# Patient Record
Sex: FEMALE | Race: WHITE | NOT HISPANIC OR LATINO | Employment: PART TIME | URBAN - METROPOLITAN AREA
[De-identification: names, ages, dates, MRNs, and addresses within clinical notes are randomized per-mention and may not be internally consistent; named-entity substitution may affect disease eponyms.]

---

## 2023-07-18 LAB
EXTERNAL CHLAMYDIA SCREEN: NORMAL
EXTERNAL GONORRHEA SCREEN: NORMAL
EXTERNAL HEPATITIS B SURFACE ANTIGEN: NEGATIVE
EXTERNAL HEPATITIS C AB: NEGATIVE
EXTERNAL RUBELLA QUALITATIVE: NORMAL
EXTERNAL SYPHILIS RPR SCREEN: NORMAL
HIV1 P24 AG SERPL QL IA: NEGATIVE

## 2023-12-06 LAB — EXTERNAL GLUCOSE TOLERANCE TEST FASTING: 140 MG/DL

## 2023-12-13 LAB — EXTERNAL GTT 3 HOURS: 85

## 2024-01-25 ENCOUNTER — HOSPITAL ENCOUNTER (INPATIENT)
Facility: HOSPITAL | Age: 24
LOS: 3 days | Discharge: HOME | End: 2024-01-29
Attending: OBSTETRICS & GYNECOLOGY | Admitting: OBSTETRICS & GYNECOLOGY
Payer: COMMERCIAL

## 2024-01-25 PROCEDURE — 25810000003 LACTATED RINGERS PER 1000 ML: Performed by: OBSTETRICS & GYNECOLOGY

## 2024-01-25 PROCEDURE — 25010000002 MAGNESIUM SULFATE 20 GM/500ML SOLUTION: Performed by: OBSTETRICS & GYNECOLOGY

## 2024-01-25 RX ORDER — MONTELUKAST SODIUM 10 MG/1
10 TABLET ORAL NIGHTLY
COMMUNITY

## 2024-01-25 RX ORDER — CETIRIZINE HYDROCHLORIDE 10 MG/1
10 TABLET ORAL DAILY
COMMUNITY

## 2024-01-25 RX ORDER — MAGNESIUM SULFATE HEPTAHYDRATE 40 MG/ML
2 INJECTION, SOLUTION INTRAVENOUS CONTINUOUS
Status: DISCONTINUED | OUTPATIENT
Start: 2024-01-26 | End: 2024-01-28

## 2024-01-25 RX ORDER — FAMOTIDINE 20 MG/1
20 TABLET, FILM COATED ORAL 2 TIMES DAILY
COMMUNITY

## 2024-01-25 RX ORDER — PRENATAL WITH FERROUS FUM AND FOLIC ACID 3080; 920; 120; 400; 22; 1.84; 3; 20; 10; 1; 12; 200; 27; 25; 2 [IU]/1; [IU]/1; MG/1; [IU]/1; MG/1; MG/1; MG/1; MG/1; MG/1; MG/1; UG/1; MG/1; MG/1; MG/1; MG/1
1 TABLET ORAL DAILY
COMMUNITY

## 2024-01-25 RX ORDER — SODIUM CHLORIDE, SODIUM LACTATE, POTASSIUM CHLORIDE, CALCIUM CHLORIDE 600; 310; 30; 20 MG/100ML; MG/100ML; MG/100ML; MG/100ML
75 INJECTION, SOLUTION INTRAVENOUS CONTINUOUS
Status: DISCONTINUED | OUTPATIENT
Start: 2024-01-26 | End: 2024-01-27

## 2024-01-25 RX ORDER — ASCORBIC ACID 500 MG
500 TABLET ORAL DAILY
COMMUNITY

## 2024-01-25 RX ORDER — MAGNESIUM SULFATE HEPTAHYDRATE 40 MG/ML
INJECTION, SOLUTION INTRAVENOUS
Status: ACTIVE
Start: 2024-01-25 | End: 2024-01-26

## 2024-01-25 RX ORDER — LORATADINE 10 MG/1
10 TABLET ORAL DAILY
COMMUNITY

## 2024-01-25 RX ADMIN — SODIUM CHLORIDE, POTASSIUM CHLORIDE, SODIUM LACTATE AND CALCIUM CHLORIDE 75 ML/HR: 600; 310; 30; 20 INJECTION, SOLUTION INTRAVENOUS at 23:55

## 2024-01-25 RX ADMIN — MAGNESIUM SULFATE HEPTAHYDRATE 2 G/HR: 40 INJECTION, SOLUTION INTRAVENOUS at 23:55

## 2024-01-26 ENCOUNTER — ANESTHESIA (OUTPATIENT)
Dept: LABOR AND DELIVERY | Facility: HOSPITAL | Age: 24
End: 2024-01-26
Payer: COMMERCIAL

## 2024-01-26 ENCOUNTER — ANESTHESIA EVENT (OUTPATIENT)
Dept: LABOR AND DELIVERY | Facility: HOSPITAL | Age: 24
End: 2024-01-26
Payer: COMMERCIAL

## 2024-01-26 PROBLEM — O14.13 PREECLAMPSIA, SEVERE, THIRD TRIMESTER: Status: ACTIVE | Noted: 2024-01-26

## 2024-01-26 LAB
ABO GROUP BLD: NORMAL
ABO GROUP BLD: NORMAL
ALBUMIN SERPL-MCNC: 3.4 G/DL (ref 3.5–5.2)
ALBUMIN/GLOB SERPL: 1.2 G/DL
ALP SERPL-CCNC: 131 U/L (ref 39–117)
ALT SERPL W P-5'-P-CCNC: 7 U/L (ref 1–33)
AMPHET+METHAMPHET UR QL: NEGATIVE
AMPHETAMINES UR QL: NEGATIVE
ANION GAP SERPL CALCULATED.3IONS-SCNC: 14 MMOL/L (ref 5–15)
AST SERPL-CCNC: 15 U/L (ref 1–32)
BARBITURATES UR QL SCN: POSITIVE
BENZODIAZ UR QL SCN: NEGATIVE
BILIRUB SERPL-MCNC: 0.7 MG/DL (ref 0–1.2)
BLD GP AB SCN SERPL QL: POSITIVE
BUN SERPL-MCNC: 3 MG/DL (ref 6–20)
BUN/CREAT SERPL: 5.3 (ref 7–25)
BUPRENORPHINE SERPL-MCNC: NEGATIVE NG/ML
CALCIUM SPEC-SCNC: 8.1 MG/DL (ref 8.6–10.5)
CANNABINOIDS SERPL QL: NEGATIVE
CHLORIDE SERPL-SCNC: 102 MMOL/L (ref 98–107)
CO2 SERPL-SCNC: 20 MMOL/L (ref 22–29)
COCAINE UR QL: NEGATIVE
CREAT SERPL-MCNC: 0.57 MG/DL (ref 0.57–1)
DEPRECATED RDW RBC AUTO: 43.6 FL (ref 37–54)
EGFRCR SERPLBLD CKD-EPI 2021: 132 ML/MIN/1.73
ERYTHROCYTE [DISTWIDTH] IN BLOOD BY AUTOMATED COUNT: 12.5 % (ref 12.3–15.4)
FENTANYL UR-MCNC: NEGATIVE NG/ML
GLOBULIN UR ELPH-MCNC: 2.9 GM/DL
GLUCOSE SERPL-MCNC: 126 MG/DL (ref 65–99)
HCT VFR BLD AUTO: 34.2 % (ref 34–46.6)
HGB BLD-MCNC: 11.7 G/DL (ref 12–15.9)
LDH SERPL-CCNC: 181 U/L (ref 135–214)
MCH RBC QN AUTO: 32.5 PG (ref 26.6–33)
MCHC RBC AUTO-ENTMCNC: 34.2 G/DL (ref 31.5–35.7)
MCV RBC AUTO: 95 FL (ref 79–97)
METHADONE UR QL SCN: NEGATIVE
OPIATES UR QL: NEGATIVE
OXYCODONE UR QL SCN: NEGATIVE
PCP UR QL SCN: NEGATIVE
PLATELET # BLD AUTO: 281 10*3/MM3 (ref 140–450)
PMV BLD AUTO: 9.7 FL (ref 6–12)
POTASSIUM SERPL-SCNC: 3.4 MMOL/L (ref 3.5–5.2)
PROT SERPL-MCNC: 6.3 G/DL (ref 6–8.5)
RBC # BLD AUTO: 3.6 10*6/MM3 (ref 3.77–5.28)
RESIDUAL RHIG DETECTED: NORMAL
RH BLD: NEGATIVE
RH BLD: NEGATIVE
SODIUM SERPL-SCNC: 136 MMOL/L (ref 136–145)
T PALLIDUM IGG SER QL: NORMAL
T&S EXPIRATION DATE: NORMAL
TRICYCLICS UR QL SCN: NEGATIVE
URATE SERPL-MCNC: 5.1 MG/DL (ref 2.4–5.7)
WBC NRBC COR # BLD AUTO: 16.32 10*3/MM3 (ref 3.4–10.8)

## 2024-01-26 PROCEDURE — 99222 1ST HOSP IP/OBS MODERATE 55: CPT | Performed by: OBSTETRICS & GYNECOLOGY

## 2024-01-26 PROCEDURE — 80053 COMPREHEN METABOLIC PANEL: CPT | Performed by: OBSTETRICS & GYNECOLOGY

## 2024-01-26 PROCEDURE — 86780 TREPONEMA PALLIDUM: CPT | Performed by: OBSTETRICS & GYNECOLOGY

## 2024-01-26 PROCEDURE — G0480 DRUG TEST DEF 1-7 CLASSES: HCPCS | Performed by: OBSTETRICS & GYNECOLOGY

## 2024-01-26 PROCEDURE — 84550 ASSAY OF BLOOD/URIC ACID: CPT | Performed by: OBSTETRICS & GYNECOLOGY

## 2024-01-26 PROCEDURE — 25010000002 PENICILLIN G POTASSIUM PER 600000 UNITS: Performed by: OBSTETRICS & GYNECOLOGY

## 2024-01-26 PROCEDURE — 59025 FETAL NON-STRESS TEST: CPT

## 2024-01-26 PROCEDURE — 86901 BLOOD TYPING SEROLOGIC RH(D): CPT

## 2024-01-26 PROCEDURE — 59200 INSERT CERVICAL DILATOR: CPT | Performed by: OBSTETRICS & GYNECOLOGY

## 2024-01-26 PROCEDURE — 86901 BLOOD TYPING SEROLOGIC RH(D): CPT | Performed by: OBSTETRICS & GYNECOLOGY

## 2024-01-26 PROCEDURE — 86870 RBC ANTIBODY IDENTIFICATION: CPT | Performed by: OBSTETRICS & GYNECOLOGY

## 2024-01-26 PROCEDURE — 80307 DRUG TEST PRSMV CHEM ANLYZR: CPT | Performed by: OBSTETRICS & GYNECOLOGY

## 2024-01-26 PROCEDURE — 83615 LACTATE (LD) (LDH) ENZYME: CPT | Performed by: OBSTETRICS & GYNECOLOGY

## 2024-01-26 PROCEDURE — 25010000002 MAGNESIUM SULFATE 20 GM/500ML SOLUTION: Performed by: OBSTETRICS & GYNECOLOGY

## 2024-01-26 PROCEDURE — 86850 RBC ANTIBODY SCREEN: CPT | Performed by: OBSTETRICS & GYNECOLOGY

## 2024-01-26 PROCEDURE — 25810000003 LACTATED RINGERS PER 1000 ML: Performed by: OBSTETRICS & GYNECOLOGY

## 2024-01-26 PROCEDURE — 86900 BLOOD TYPING SEROLOGIC ABO: CPT

## 2024-01-26 PROCEDURE — 86900 BLOOD TYPING SEROLOGIC ABO: CPT | Performed by: OBSTETRICS & GYNECOLOGY

## 2024-01-26 PROCEDURE — 36415 COLL VENOUS BLD VENIPUNCTURE: CPT | Performed by: OBSTETRICS & GYNECOLOGY

## 2024-01-26 PROCEDURE — 10907ZC DRAINAGE OF AMNIOTIC FLUID, THERAPEUTIC FROM PRODUCTS OF CONCEPTION, VIA NATURAL OR ARTIFICIAL OPENING: ICD-10-PCS | Performed by: OBSTETRICS & GYNECOLOGY

## 2024-01-26 PROCEDURE — 85027 COMPLETE CBC AUTOMATED: CPT | Performed by: OBSTETRICS & GYNECOLOGY

## 2024-01-26 PROCEDURE — 3E033VJ INTRODUCTION OF OTHER HORMONE INTO PERIPHERAL VEIN, PERCUTANEOUS APPROACH: ICD-10-PCS | Performed by: OBSTETRICS & GYNECOLOGY

## 2024-01-26 RX ORDER — MONTELUKAST SODIUM 10 MG/1
10 TABLET ORAL NIGHTLY
Status: DISCONTINUED | OUTPATIENT
Start: 2024-01-26 | End: 2024-01-29 | Stop reason: HOSPADM

## 2024-01-26 RX ORDER — PROMETHAZINE HYDROCHLORIDE 12.5 MG/1
12.5 SUPPOSITORY RECTAL EVERY 6 HOURS PRN
Status: DISCONTINUED | OUTPATIENT
Start: 2024-01-26 | End: 2024-01-29 | Stop reason: HOSPADM

## 2024-01-26 RX ORDER — SODIUM CHLORIDE 0.9 % (FLUSH) 0.9 %
10 SYRINGE (ML) INJECTION EVERY 12 HOURS SCHEDULED
Status: DISCONTINUED | OUTPATIENT
Start: 2024-01-26 | End: 2024-01-29 | Stop reason: HOSPADM

## 2024-01-26 RX ORDER — SODIUM CHLORIDE 0.9 % (FLUSH) 0.9 %
10 SYRINGE (ML) INJECTION AS NEEDED
Status: DISCONTINUED | OUTPATIENT
Start: 2024-01-26 | End: 2024-01-29 | Stop reason: HOSPADM

## 2024-01-26 RX ORDER — PROMETHAZINE HYDROCHLORIDE 12.5 MG/1
12.5 TABLET ORAL EVERY 6 HOURS PRN
Status: DISCONTINUED | OUTPATIENT
Start: 2024-01-26 | End: 2024-01-29 | Stop reason: HOSPADM

## 2024-01-26 RX ORDER — MORPHINE SULFATE 2 MG/ML
2 INJECTION, SOLUTION INTRAMUSCULAR; INTRAVENOUS
Status: DISCONTINUED | OUTPATIENT
Start: 2024-01-26 | End: 2024-01-29 | Stop reason: HOSPADM

## 2024-01-26 RX ORDER — SODIUM CHLORIDE 9 MG/ML
40 INJECTION, SOLUTION INTRAVENOUS AS NEEDED
Status: DISCONTINUED | OUTPATIENT
Start: 2024-01-26 | End: 2024-01-29 | Stop reason: HOSPADM

## 2024-01-26 RX ORDER — DIPHENHYDRAMINE HCL 25 MG
25 CAPSULE ORAL EVERY 6 HOURS PRN
Status: DISCONTINUED | OUTPATIENT
Start: 2024-01-26 | End: 2024-01-29 | Stop reason: HOSPADM

## 2024-01-26 RX ORDER — ONDANSETRON 4 MG/1
4 TABLET, ORALLY DISINTEGRATING ORAL EVERY 6 HOURS PRN
Status: DISCONTINUED | OUTPATIENT
Start: 2024-01-26 | End: 2024-01-29 | Stop reason: HOSPADM

## 2024-01-26 RX ORDER — POTASSIUM CHLORIDE 750 MG/1
20 CAPSULE, EXTENDED RELEASE ORAL ONCE
Status: COMPLETED | OUTPATIENT
Start: 2024-01-26 | End: 2024-01-26

## 2024-01-26 RX ORDER — MAGNESIUM CARB/ALUMINUM HYDROX 105-160MG
30 TABLET,CHEWABLE ORAL ONCE
Status: DISCONTINUED | OUTPATIENT
Start: 2024-01-26 | End: 2024-01-27

## 2024-01-26 RX ORDER — OXYTOCIN/0.9 % SODIUM CHLORIDE 30/500 ML
999 PLASTIC BAG, INJECTION (ML) INTRAVENOUS ONCE
Status: COMPLETED | OUTPATIENT
Start: 2024-01-26 | End: 2024-01-27

## 2024-01-26 RX ORDER — CETIRIZINE HYDROCHLORIDE 10 MG/1
10 TABLET ORAL DAILY
Status: DISCONTINUED | OUTPATIENT
Start: 2024-01-26 | End: 2024-01-29 | Stop reason: HOSPADM

## 2024-01-26 RX ORDER — LIDOCAINE HYDROCHLORIDE 10 MG/ML
0.5 INJECTION, SOLUTION EPIDURAL; INFILTRATION; INTRACAUDAL; PERINEURAL ONCE AS NEEDED
Status: DISCONTINUED | OUTPATIENT
Start: 2024-01-26 | End: 2024-01-29 | Stop reason: HOSPADM

## 2024-01-26 RX ORDER — IBUPROFEN 600 MG/1
600 TABLET ORAL EVERY 6 HOURS PRN
Status: DISCONTINUED | OUTPATIENT
Start: 2024-01-26 | End: 2024-01-27

## 2024-01-26 RX ORDER — SODIUM CHLORIDE, SODIUM LACTATE, POTASSIUM CHLORIDE, CALCIUM CHLORIDE 600; 310; 30; 20 MG/100ML; MG/100ML; MG/100ML; MG/100ML
125 INJECTION, SOLUTION INTRAVENOUS CONTINUOUS
Status: DISCONTINUED | OUTPATIENT
Start: 2024-01-26 | End: 2024-01-29 | Stop reason: HOSPADM

## 2024-01-26 RX ORDER — ACETAMINOPHEN 325 MG/1
650 TABLET ORAL EVERY 4 HOURS PRN
Status: DISCONTINUED | OUTPATIENT
Start: 2024-01-26 | End: 2024-01-27

## 2024-01-26 RX ORDER — OXYTOCIN/0.9 % SODIUM CHLORIDE 30/500 ML
2-20 PLASTIC BAG, INJECTION (ML) INTRAVENOUS
Status: DISCONTINUED | OUTPATIENT
Start: 2024-01-26 | End: 2024-01-27

## 2024-01-26 RX ORDER — FLUOXETINE HYDROCHLORIDE 20 MG/1
20 CAPSULE ORAL DAILY
COMMUNITY

## 2024-01-26 RX ORDER — BUTALBITAL, ACETAMINOPHEN AND CAFFEINE 50; 325; 40 MG/1; MG/1; MG/1
2 TABLET ORAL EVERY 4 HOURS PRN
Status: DISCONTINUED | OUTPATIENT
Start: 2024-01-26 | End: 2024-01-29 | Stop reason: HOSPADM

## 2024-01-26 RX ORDER — OXYTOCIN/0.9 % SODIUM CHLORIDE 30/500 ML
250 PLASTIC BAG, INJECTION (ML) INTRAVENOUS CONTINUOUS
Status: ACTIVE | OUTPATIENT
Start: 2024-01-26 | End: 2024-01-27

## 2024-01-26 RX ORDER — PENICILLIN G 3000000 [IU]/50ML
3 INJECTION, SOLUTION INTRAVENOUS EVERY 4 HOURS
Qty: 600 ML | Refills: 0 | Status: DISCONTINUED | OUTPATIENT
Start: 2024-01-26 | End: 2024-01-27

## 2024-01-26 RX ORDER — FAMOTIDINE 20 MG/1
20 TABLET, FILM COATED ORAL
Status: DISCONTINUED | OUTPATIENT
Start: 2024-01-26 | End: 2024-01-29 | Stop reason: HOSPADM

## 2024-01-26 RX ORDER — ONDANSETRON 2 MG/ML
4 INJECTION INTRAMUSCULAR; INTRAVENOUS EVERY 6 HOURS PRN
Status: DISCONTINUED | OUTPATIENT
Start: 2024-01-26 | End: 2024-01-29 | Stop reason: HOSPADM

## 2024-01-26 RX ADMIN — FAMOTIDINE 20 MG: 20 TABLET, FILM COATED ORAL at 17:31

## 2024-01-26 RX ADMIN — BUTALBITAL, ACETAMINOPHEN, AND CAFFEINE 2 TABLET: 50; 325; 40 TABLET ORAL at 00:54

## 2024-01-26 RX ADMIN — PENICILLIN G 3 MILLION UNITS: 3000000 INJECTION, SOLUTION INTRAVENOUS at 17:31

## 2024-01-26 RX ADMIN — PENICILLIN G 3 MILLION UNITS: 3000000 INJECTION, SOLUTION INTRAVENOUS at 21:26

## 2024-01-26 RX ADMIN — CETIRIZINE HYDROCHLORIDE 10 MG: 10 TABLET, FILM COATED ORAL at 10:34

## 2024-01-26 RX ADMIN — Medication 2 MILLI-UNITS/MIN: at 09:03

## 2024-01-26 RX ADMIN — MONTELUKAST 10 MG: 10 TABLET, FILM COATED ORAL at 22:41

## 2024-01-26 RX ADMIN — PENICILLIN G 3 MILLION UNITS: 3000000 INJECTION, SOLUTION INTRAVENOUS at 14:35

## 2024-01-26 RX ADMIN — MAGNESIUM SULFATE HEPTAHYDRATE 2 G/HR: 40 INJECTION, SOLUTION INTRAVENOUS at 09:34

## 2024-01-26 RX ADMIN — SODIUM CHLORIDE 5 MILLION UNITS: 900 INJECTION INTRAVENOUS at 07:55

## 2024-01-26 RX ADMIN — METOPROLOL TARTRATE 25 MG: 25 TABLET, FILM COATED ORAL at 10:34

## 2024-01-26 RX ADMIN — POTASSIUM CHLORIDE 20 MEQ: 750 CAPSULE, EXTENDED RELEASE ORAL at 14:35

## 2024-01-26 RX ADMIN — SODIUM CHLORIDE, POTASSIUM CHLORIDE, SODIUM LACTATE AND CALCIUM CHLORIDE 75 ML/HR: 600; 310; 30; 20 INJECTION, SOLUTION INTRAVENOUS at 13:20

## 2024-01-26 RX ADMIN — BUTALBITAL, ACETAMINOPHEN, AND CAFFEINE 2 TABLET: 50; 325; 40 TABLET ORAL at 08:36

## 2024-01-26 NOTE — PROGRESS NOTES
Laborist    Patient seen and examined chart reviewed  Cervical Maurer in place patient without complaints.  Fetal heart rate category 1  Blood pressure stable  Cephalic confirmed with portable ultrasound  PLAN  Continue induction of labor.

## 2024-01-26 NOTE — PROCEDURES
24 y.o.  OB History          1    Para        Term                AB        Living             SAB        IAB        Ectopic        Molar        Multiple        Live Births                 Presents 35 0/7 as an induction of labour due to severe pre-eclampsia   Her primary OB requests a Maurer Bulb placement to initiate the induction of labour.    Fetal Heart Rate Assessment   Method: Fetal HR Assessment Method: external   Beats/min: Fetal HR (beats/min): 125   Baseline: Fetal HR Baseline: normal range   Varibility: Fetal HR Variability: moderate (amplitude range 6 to 25 bpm)   Accels: Fetal HR Accelerations: greater than/equal to 15 bpm, lasting at least 15 seconds   Decels: Fetal HR Decelerations: absent   Tracing Category:       TOCO:  Irregular   SVE:  1/60/-2    A Maurer Bulb was placed without difficulties with 60 cc of sterile saline.  The patient tolerated the procedure well.

## 2024-01-26 NOTE — H&P
"[facility]  Obstetric History and Physical    Transfer of care:  [facility]     Chief Complaint   Patient presents with    Hypertension       HPI:    Patient is a 24 y.o. female  currently at 35w0d, who presents by EMS as a transfer of care due to pre-eclampsia.   Toya reports having a headache that really has not gone away for about 2 weeks.  She has also had elevated blood pressures over the last couple of weeks.  Her labs have been normal.  She had a 24 hour urine protein on .   She received a 6 gm load of magnesium followed by 2gms/hr maintenance.   She still reports a headache that was a 8/10, but decreased down to a 4-5/10 after Fioricet.   She denies vision changes and RUQ pian.    She is having some contractions that she does feel as mild tightening.  She denies vaginal bleeding and leaking.  +FM.  She was reported to be 1 cm prior to being transferred   She has a history of tachycardia and has been taking Metoprolol 25 mg BID      The following portions of the patients history were reviewed and updated as appropriate: current medications, allergies, past medical history, past surgical history, past family history, past social history and problem list .       Prenatal Information:   Maternal Prenatal Labs  Blood Type ABO Type   Date Value Ref Range Status   2024 O  Final      Rh Status RH type   Date Value Ref Range Status   2024 Negative  Final      Antibody Screen Antibody Screen   Date Value Ref Range Status   2024 Positive  Final      Gonnorhea No results found for: \"GCCX\"   Chlamydia No results found for: \"CLAMYDCU\"   RPR No results found for: \"RPR\"   Syphilis Antibody No results found for: \"SYPHILIS\"   Rubella No results found for: \"RUBELLAIGGIN\"   Hepatitis B Surface Antigen No results found for: \"HEPBSAG\"   HIV-1 Antibody No results found for: \"LABHIV1\"   Hepatitis C Antibody No results found for: \"HEPCAB\"   Rapid Urin Drug Screen No results found " "for: \"AMPMETHU\", \"BARBITSCNUR\", \"LABBENZSCN\", \"LABMETHSCN\", \"LABOPIASCN\", \"THCURSCR\", \"COCAINEUR\", \"AMPHETSCREEN\", \"PROPOXSCN\", \"BUPRENORSCNU\", \"METAMPSCNUR\", \"OXYCODONESCN\", \"TRICYCLICSCN\"   Group B Strep Culture No results found for: \"GBSANTIGEN\"           External Prenatal Results       Pregnancy Outside Results - Transcribed From Office Records - See Scanned Records For Details       Test Value Date Time    ABO  O  24 012    Rh  Negative  24    Antibody Screen  Positive  24 012    Varicella IgG       Rubella       Hgb  11.7 g/dL 24 0003    Hct  34.2 % 24 0003    Glucose Fasting GTT       Glucose Tolerance Test 1 hour       Glucose Tolerance Test 3 hour       Gonorrhea (discrete)       Chlamydia (discrete)       RPR       VDRL       Syphilis Antibody       HBsAg       Herpes Simplex Virus PCR       Herpes Simplex VIrus Culture       HIV       Hep C RNA Quant PCR       Hep C Antibody       AFP       Group B Strep       GBS Susceptibility to Clindamycin       GBS Susceptibility to Erythromycin       Fetal Fibronectin       Genetic Testing, Maternal Blood                 Drug Screening       Test Value Date Time    Urine Drug Screen       Amphetamine Screen       Barbiturate Screen       Benzodiazepine Screen       Methadone Screen       Phencyclidine Screen       Opiates Screen       THC Screen       Cocaine Screen       Propoxyphene Screen       Buprenorphine Screen       Methamphetamine Screen       Oxycodone Screen       Tricyclic Antidepressants Screen                 Legend    ^: Historical                              Past OB History:     OB History    Para Term  AB Living   1 0 0 0 0 0   SAB IAB Ectopic Molar Multiple Live Births   0 0 0 0 0 0      # Outcome Date GA Lbr Slim/2nd Weight Sex Delivery Anes PTL Lv   1 Current                Past Medical History: Past Medical History:   Diagnosis Date    Rh incompatibility       Past Surgical History Past Surgical " History:   Procedure Laterality Date    LAPAROSCOPIC CHOLECYSTECTOMY  04/01/2021      Family History: No family history on file.   Social History:  reports that she has never smoked. She does not have any smokeless tobacco history on file.   reports that she does not currently use alcohol.   reports that she does not currently use drugs.        Review of Systems  Denies fever,CP, Shortness of air, muscle weakness,                                             and rashes      Objective     Vital Signs Range for the last 24 hours  Temperature: Temp:  [98.2 °F (36.8 °C)] 98.2 °F (36.8 °C)   Temp Source: Temp src: Oral   BP: BP: (131-141)/(65-87) 139/72   Pulse: Heart Rate:  [] 101   Respirations: Resp:  [16-18] 16   SPO2: SpO2:  [99 %-100 %] 99 %   O2 Amount (l/min):     O2 Devices Device (Oxygen Therapy): room air   Weight: Weight:  [105 kg (231 lb)] 105 kg (231 lb)     Physical Examination: General appearance - alert with no acute distress   Chest - CTAB  Heart - Tachycardia   Abdomen - soft, nontender, nondistended,   no rebound tenderness noted  No guarding, No RUQ pain, gravid   Extremities - pedal edema +1      Fetal Heart Rate Assessment   Method: Fetal HR Assessment Method: external   Beats/min: Fetal HR (beats/min): 135   Baseline: Fetal HR Baseline: normal range   Varibility: Fetal HR Variability: moderate (amplitude range 6 to 25 bpm)   Accels: Fetal HR Accelerations: greater than/equal to 15 bpm, lasting at least 15 seconds   Decels: Fetal HR Decelerations: absent   Tracing Category:       Uterine Assessment   Method: Method: external tocotransducer   Frequency (min): Contraction Frequency (Minutes): 4-5   Ctx Count in 10 min:     Duration:     Intensity: Contraction Intensity: mild by palpation   Intensity by IUPC:     Resting Tone: Uterine Resting Tone: soft by palpation   Resting Tone by IUPC:       Labs:  Lab Results   Component Value Date     01/26/2024    HGB 11.7 (L) 01/26/2024    HCT 34.2  01/26/2024    WBC 16.32 (H) 01/26/2024      Lab Results   Component Value Date    HGB 11.7 (L) 01/26/2024     01/26/2024    AST 15 01/26/2024    ALT 7 01/26/2024     01/26/2024    URICACID 5.1 01/26/2024    BUN 3 (L) 01/26/2024    CREATININE 0.57 01/26/2024    GLUCOSE 126 (H) 01/26/2024        Assessment:  1. Intrauterine pregnancy at 35w0d weeks gestation with reactive fetal status  2. Severe pre-eclampsia with mild range blood pressures and neurological symptoms  3. Tachycardia    4.  Rhesus negative   5. GBS unknown     Plan:  1. Admit  2. IV, Magnesium 2 gms/hour  3. ABX for GBS unknown   4.  Maurer bulb placement for IOL  5.  Pitocin  6. NICU consult       Alessandro Romero MD  1/26/2024  04:42 EST

## 2024-01-27 LAB
ATMOSPHERIC PRESS: ABNORMAL MM[HG]
ATMOSPHERIC PRESS: ABNORMAL MM[HG]
BASE EXCESS BLDCOA CALC-SCNC: -6.3 MMOL/L (ref 0–2)
BASE EXCESS BLDCOV CALC-SCNC: -1.2 MMOL/L (ref 0–2)
BDY SITE: ABNORMAL
BDY SITE: ABNORMAL
BODY TEMPERATURE: 37
BODY TEMPERATURE: 37
CO2 BLDA-SCNC: 23.8 MMOL/L (ref 22–33)
CO2 BLDA-SCNC: 24.6 MMOL/L (ref 22–33)
EPAP: 0
EPAP: 0
HCO3 BLDCOA-SCNC: 22.9 MMOL/L (ref 16.9–20.5)
HCO3 BLDCOV-SCNC: 22.7 MMOL/L (ref 18.6–21.4)
HGB BLDA-MCNC: 21.2 G/DL (ref 14–18)
HGB BLDA-MCNC: 21.8 G/DL (ref 14–18)
INHALED O2 CONCENTRATION: 21 %
INHALED O2 CONCENTRATION: 21 %
IPAP: 0
IPAP: 0
MODALITY: ABNORMAL
MODALITY: ABNORMAL
PAW @ PEAK INSP FLOW SETTING VENT: 0 CMH2O
PAW @ PEAK INSP FLOW SETTING VENT: 0 CMH2O
PCO2 BLDCOA: 56.4 MMHG (ref 43.3–54.9)
PCO2 BLDCOV: 35.9 MM HG (ref 28–40)
PH BLDCOA: 7.22 PH UNITS (ref 7.22–7.3)
PH BLDCOV: 7.41 PH UNITS (ref 7.31–7.37)
PO2 BLDCOA: 27.7 MMHG (ref 11.5–43.3)
PO2 BLDCOV: 27.2 MM HG (ref 21–31)
SAO2 % BLDCOA: 53 %
SAO2 % BLDCOA: ABNORMAL %
SAO2 % BLDCOV: 69.9 %
TOTAL RATE: 0 BREATHS/MINUTE
TOTAL RATE: 0 BREATHS/MINUTE
VENTILATOR MODE: ABNORMAL
VENTILATOR MODE: ABNORMAL

## 2024-01-27 PROCEDURE — 59025 FETAL NON-STRESS TEST: CPT

## 2024-01-27 PROCEDURE — 25010000002 FENTANYL CITRATE (PF) 50 MCG/ML SOLUTION: Performed by: OBSTETRICS & GYNECOLOGY

## 2024-01-27 PROCEDURE — 0KQM0ZZ REPAIR PERINEUM MUSCLE, OPEN APPROACH: ICD-10-PCS | Performed by: OBSTETRICS & GYNECOLOGY

## 2024-01-27 PROCEDURE — 25810000003 LACTATED RINGERS PER 1000 ML: Performed by: OBSTETRICS & GYNECOLOGY

## 2024-01-27 PROCEDURE — 94799 UNLISTED PULMONARY SVC/PX: CPT

## 2024-01-27 PROCEDURE — 25010000002 ROPIVACAINE PER 1 MG: Performed by: ANESTHESIOLOGY

## 2024-01-27 PROCEDURE — 59409 OBSTETRICAL CARE: CPT | Performed by: OBSTETRICS & GYNECOLOGY

## 2024-01-27 PROCEDURE — 25010000002 BUPIVACAINE (PF) 0.25 % SOLUTION: Performed by: ANESTHESIOLOGY

## 2024-01-27 PROCEDURE — 25010000002 MAGNESIUM SULFATE 20 GM/500ML SOLUTION: Performed by: OBSTETRICS & GYNECOLOGY

## 2024-01-27 PROCEDURE — 25010000002 FENTANYL CITRATE (PF) 50 MCG/ML SOLUTION: Performed by: ANESTHESIOLOGY

## 2024-01-27 PROCEDURE — C1755 CATHETER, INTRASPINAL: HCPCS

## 2024-01-27 PROCEDURE — 25010000002 PENICILLIN G POTASSIUM PER 600000 UNITS: Performed by: OBSTETRICS & GYNECOLOGY

## 2024-01-27 PROCEDURE — 51703 INSERT BLADDER CATH COMPLEX: CPT

## 2024-01-27 PROCEDURE — 82805 BLOOD GASES W/O2 SATURATION: CPT

## 2024-01-27 PROCEDURE — C1755 CATHETER, INTRASPINAL: HCPCS | Performed by: ANESTHESIOLOGY

## 2024-01-27 RX ORDER — LIDOCAINE HYDROCHLORIDE 10 MG/ML
INJECTION, SOLUTION INFILTRATION; PERINEURAL
Status: DISCONTINUED
Start: 2024-01-27 | End: 2024-01-29 | Stop reason: HOSPADM

## 2024-01-27 RX ORDER — BISACODYL 10 MG
10 SUPPOSITORY, RECTAL RECTAL DAILY PRN
Status: DISCONTINUED | OUTPATIENT
Start: 2024-01-28 | End: 2024-01-29 | Stop reason: HOSPADM

## 2024-01-27 RX ORDER — FENTANYL CITRATE 50 UG/ML
INJECTION, SOLUTION INTRAMUSCULAR; INTRAVENOUS AS NEEDED
Status: DISCONTINUED | OUTPATIENT
Start: 2024-01-27 | End: 2024-01-27 | Stop reason: SURG

## 2024-01-27 RX ORDER — ROPIVACAINE HYDROCHLORIDE 2 MG/ML
15 INJECTION, SOLUTION EPIDURAL; INFILTRATION; PERINEURAL CONTINUOUS
Status: DISCONTINUED | OUTPATIENT
Start: 2024-01-27 | End: 2024-01-27

## 2024-01-27 RX ORDER — FAMOTIDINE 10 MG/ML
20 INJECTION, SOLUTION INTRAVENOUS ONCE AS NEEDED
Status: DISCONTINUED | OUTPATIENT
Start: 2024-01-27 | End: 2024-01-29 | Stop reason: HOSPADM

## 2024-01-27 RX ORDER — ASCORBIC ACID 500 MG
500 TABLET ORAL
Status: DISCONTINUED | OUTPATIENT
Start: 2024-01-28 | End: 2024-01-29 | Stop reason: HOSPADM

## 2024-01-27 RX ORDER — ACETAMINOPHEN 325 MG/1
650 TABLET ORAL EVERY 6 HOURS PRN
Status: DISCONTINUED | OUTPATIENT
Start: 2024-01-27 | End: 2024-01-29 | Stop reason: HOSPADM

## 2024-01-27 RX ORDER — SODIUM CHLORIDE 0.9 % (FLUSH) 0.9 %
1-10 SYRINGE (ML) INJECTION AS NEEDED
Status: DISCONTINUED | OUTPATIENT
Start: 2024-01-27 | End: 2024-01-29 | Stop reason: HOSPADM

## 2024-01-27 RX ORDER — ONDANSETRON 2 MG/ML
4 INJECTION INTRAMUSCULAR; INTRAVENOUS ONCE AS NEEDED
Status: DISCONTINUED | OUTPATIENT
Start: 2024-01-27 | End: 2024-01-29 | Stop reason: HOSPADM

## 2024-01-27 RX ORDER — BUPIVACAINE HYDROCHLORIDE 2.5 MG/ML
INJECTION, SOLUTION EPIDURAL; INFILTRATION; INTRACAUDAL AS NEEDED
Status: DISCONTINUED | OUTPATIENT
Start: 2024-01-27 | End: 2024-01-27 | Stop reason: SURG

## 2024-01-27 RX ORDER — IBUPROFEN 600 MG/1
600 TABLET ORAL EVERY 6 HOURS PRN
Status: DISCONTINUED | OUTPATIENT
Start: 2024-01-27 | End: 2024-01-29 | Stop reason: HOSPADM

## 2024-01-27 RX ORDER — DIPHENHYDRAMINE HYDROCHLORIDE 50 MG/ML
12.5 INJECTION INTRAMUSCULAR; INTRAVENOUS EVERY 8 HOURS PRN
Status: DISCONTINUED | OUTPATIENT
Start: 2024-01-27 | End: 2024-01-29 | Stop reason: HOSPADM

## 2024-01-27 RX ORDER — OXYTOCIN/0.9 % SODIUM CHLORIDE 30/500 ML
125 PLASTIC BAG, INJECTION (ML) INTRAVENOUS CONTINUOUS PRN
Status: DISCONTINUED | OUTPATIENT
Start: 2024-01-27 | End: 2024-01-29 | Stop reason: HOSPADM

## 2024-01-27 RX ORDER — HYDROXYZINE HYDROCHLORIDE 25 MG/1
50 TABLET, FILM COATED ORAL NIGHTLY PRN
Status: DISCONTINUED | OUTPATIENT
Start: 2024-01-27 | End: 2024-01-29 | Stop reason: HOSPADM

## 2024-01-27 RX ORDER — HYDROCODONE BITARTRATE AND ACETAMINOPHEN 10; 325 MG/1; MG/1
1 TABLET ORAL EVERY 4 HOURS PRN
Status: DISCONTINUED | OUTPATIENT
Start: 2024-01-27 | End: 2024-01-29 | Stop reason: HOSPADM

## 2024-01-27 RX ORDER — HYDROCODONE BITARTRATE AND ACETAMINOPHEN 5; 325 MG/1; MG/1
1 TABLET ORAL EVERY 4 HOURS PRN
Status: DISCONTINUED | OUTPATIENT
Start: 2024-01-27 | End: 2024-01-29 | Stop reason: HOSPADM

## 2024-01-27 RX ORDER — CITRIC ACID/SODIUM CITRATE 334-500MG
30 SOLUTION, ORAL ORAL ONCE
Status: DISCONTINUED | OUTPATIENT
Start: 2024-01-27 | End: 2024-01-29 | Stop reason: HOSPADM

## 2024-01-27 RX ORDER — DOCUSATE SODIUM 100 MG/1
100 CAPSULE, LIQUID FILLED ORAL 2 TIMES DAILY
Status: DISCONTINUED | OUTPATIENT
Start: 2024-01-27 | End: 2024-01-29 | Stop reason: HOSPADM

## 2024-01-27 RX ORDER — FENTANYL CITRATE 50 UG/ML
50 INJECTION, SOLUTION INTRAMUSCULAR; INTRAVENOUS ONCE
Status: COMPLETED | OUTPATIENT
Start: 2024-01-27 | End: 2024-01-27

## 2024-01-27 RX ORDER — LIDOCAINE HYDROCHLORIDE AND EPINEPHRINE 15; 5 MG/ML; UG/ML
INJECTION, SOLUTION EPIDURAL AS NEEDED
Status: DISCONTINUED | OUTPATIENT
Start: 2024-01-27 | End: 2024-01-27 | Stop reason: SURG

## 2024-01-27 RX ORDER — HYDROCORTISONE 25 MG/G
1 CREAM TOPICAL AS NEEDED
Status: DISCONTINUED | OUTPATIENT
Start: 2024-01-27 | End: 2024-01-29 | Stop reason: HOSPADM

## 2024-01-27 RX ORDER — FERROUS SULFATE 325(65) MG
150 TABLET ORAL
Status: DISCONTINUED | OUTPATIENT
Start: 2024-01-28 | End: 2024-01-29 | Stop reason: HOSPADM

## 2024-01-27 RX ORDER — EPHEDRINE SULFATE 5 MG/ML
10 INJECTION INTRAVENOUS
Status: DISCONTINUED | OUTPATIENT
Start: 2024-01-27 | End: 2024-01-29 | Stop reason: HOSPADM

## 2024-01-27 RX ADMIN — ROPIVACAINE HYDROCHLORIDE 14 ML/HR: 2 INJECTION, SOLUTION EPIDURAL; INFILTRATION at 02:48

## 2024-01-27 RX ADMIN — Medication 999 ML/HR: at 07:12

## 2024-01-27 RX ADMIN — Medication 10 ML: at 21:04

## 2024-01-27 RX ADMIN — PENICILLIN G 3 MILLION UNITS: 3000000 INJECTION, SOLUTION INTRAVENOUS at 01:34

## 2024-01-27 RX ADMIN — IBUPROFEN 600 MG: 600 TABLET ORAL at 12:30

## 2024-01-27 RX ADMIN — LIDOCAINE HYDROCHLORIDE AND EPINEPHRINE 3 ML: 15; 5 INJECTION, SOLUTION EPIDURAL at 02:40

## 2024-01-27 RX ADMIN — OXYCODONE HYDROCHLORIDE AND ACETAMINOPHEN 500 MG: 500 TABLET ORAL at 23:39

## 2024-01-27 RX ADMIN — FERROUS SULFATE TAB 325 MG (65 MG ELEMENTAL FE) 162.5 MG: 325 (65 FE) TAB at 23:33

## 2024-01-27 RX ADMIN — PENICILLIN G 3 MILLION UNITS: 3000000 INJECTION, SOLUTION INTRAVENOUS at 05:54

## 2024-01-27 RX ADMIN — BUPIVACAINE HYDROCHLORIDE 10 ML: 2.5 INJECTION, SOLUTION EPIDURAL; INFILTRATION; INTRACAUDAL; PERINEURAL at 02:43

## 2024-01-27 RX ADMIN — MONTELUKAST 10 MG: 10 TABLET, FILM COATED ORAL at 21:08

## 2024-01-27 RX ADMIN — FENTANYL CITRATE 100 MCG: 50 INJECTION, SOLUTION INTRAMUSCULAR; INTRAVENOUS at 02:42

## 2024-01-27 RX ADMIN — LIDOCAINE HYDROCHLORIDE AND EPINEPHRINE 2 ML: 15; 5 INJECTION, SOLUTION EPIDURAL at 02:41

## 2024-01-27 RX ADMIN — SODIUM CHLORIDE, POTASSIUM CHLORIDE, SODIUM LACTATE AND CALCIUM CHLORIDE 100 ML/HR: 600; 310; 30; 20 INJECTION, SOLUTION INTRAVENOUS at 09:16

## 2024-01-27 RX ADMIN — IBUPROFEN 600 MG: 600 TABLET ORAL at 19:48

## 2024-01-27 RX ADMIN — FENTANYL CITRATE 50 MCG: 50 INJECTION, SOLUTION INTRAMUSCULAR; INTRAVENOUS at 02:09

## 2024-01-27 RX ADMIN — BENZOCAINE AND LEVOMENTHOL 1 APPLICATION: 200; 5 SPRAY TOPICAL at 12:30

## 2024-01-27 RX ADMIN — CETIRIZINE HYDROCHLORIDE 10 MG: 10 TABLET, FILM COATED ORAL at 11:21

## 2024-01-27 RX ADMIN — HYDROCODONE BITARTRATE AND ACETAMINOPHEN 1 TABLET: 5; 325 TABLET ORAL at 23:32

## 2024-01-27 RX ADMIN — FAMOTIDINE 20 MG: 20 TABLET, FILM COATED ORAL at 11:21

## 2024-01-27 RX ADMIN — MAGNESIUM SULFATE HEPTAHYDRATE 1 G/HR: 40 INJECTION, SOLUTION INTRAVENOUS at 05:57

## 2024-01-27 RX ADMIN — SODIUM CHLORIDE, POTASSIUM CHLORIDE, SODIUM LACTATE AND CALCIUM CHLORIDE 75 ML/HR: 600; 310; 30; 20 INJECTION, SOLUTION INTRAVENOUS at 05:58

## 2024-01-27 RX ADMIN — DOCUSATE SODIUM 100 MG: 100 CAPSULE, LIQUID FILLED ORAL at 11:21

## 2024-01-27 RX ADMIN — DOCUSATE SODIUM 100 MG: 100 CAPSULE, LIQUID FILLED ORAL at 21:08

## 2024-01-27 NOTE — L&D DELIVERY NOTE
[facility]   Vaginal Delivery Note    Patient Name: Toya Root  : 2000  MRN: [id number]    Date of Delivery: 2024     Diagnosis     Pre & Post-Delivery:  Intrauterine pregnancy at 35w1d  Labor status: Induced Onset of Labor     Preeclampsia, severe, third trimester             Problem List    Transfer to Postpartum     Review the Delivery Report for details.     Delivery     Delivery: Vaginal, Spontaneous     YOB: 2022    Time of Birth:  Gestational Age 7:09 AM   35w1d     Anesthesia: Epidural     Delivering clinician: Blanco SANCHEZ High    Forceps?   No   Vacuum? No    Shoulder dystocia present: No        Delivery narrative:  Pt spont del a male infant over an intact perineum.  Shoulder cord reduced on perineum.  Cord milked X 3,  cord clamped and cut.  Segment for ABG's obtained.  Cord blood obtained and placenta del intact.  Bilat sulcus tears repaired and second degree perineal repaired with restoration of normal anatomy.  Uterus explored with no palpable fragments.      Infant     Findings: male  infant     Infant observations: Weight: No birth weight on file.   Length:   in  Observations/Comments:        Apgars: 6  @ 1 minute /    7  @ 5 minutes   Infant Name: Carlos     Placenta & Cord         Placenta delivered  Spontaneous  at   2024  7:12 AM     Cord:   present.   Nuchal Cord?  no   Cord blood obtained:     Cord gases obtained:      Cord gas results: Venous:    pH, Cord Venous   Date Value Ref Range Status   2024 7.408 (H) 7.310 - 7.370 pH Units Final     Base Excess, Cord Venous   Date Value Ref Range Status   2024 -1.2 (L) 0.0 - 2.0 mmol/L Final       Arterial:    pH, Cord Arterial   Date Value Ref Range Status   2024 7.22 7.22 - 7.30 pH Units Final     Base Exc, Cord Arterial   Date Value Ref Range Status   2024 -6.3 (L) 0.0 - 2.0 mmol/L Final        Repair     Episiotomy: None     No    Lacerations: Yes  Laceration  Information  Laceration Repaired?   Perineal: None      Periurethral:       Labial:       Sulcus: bilateral  Yes    Vaginal:       Cervical:         Suture used for repair: 2-0 vicryl rapide     Estimated Blood Loss: Est. Blood Loss (mL): 100 mL (Filed from Delivery Summary) (01/27/24 0709)     Quantitative Blood Loss:          Complications     hypertension    Disposition     Mother to APU  in stable condition currently.  Baby to NICU  in stable condition currently.    Blanco Mathews MD  01/27/24  07:41 EST

## 2024-01-27 NOTE — PROGRESS NOTES
Toya Root  [id number]  2000      VE 7-8/90%/-1.  Pt now with epidural  FHT's reassuring, contractions adequate.  BP's stable  P/1)continue current management    Blanco Mathews MD  1/27/2024  06:08 EST

## 2024-01-27 NOTE — PROGRESS NOTES
Toya Root  [id number]  2000      VE 5/80%/-1, IUPC placed  FHT's reassuring, contractions still prob not adequate  BP's stable  P/1)continue current management\     2)epidural when anesthesia is available      Blanco Mathews MD  1/27/2024  01:58 EST

## 2024-01-27 NOTE — ANESTHESIA PREPROCEDURE EVALUATION
Anesthesia Evaluation     Patient summary reviewed and Nursing notes reviewed                Airway   Mallampati: II  TM distance: >3 FB  Neck ROM: full  No difficulty expected  Dental      Pulmonary - negative pulmonary ROS   Cardiovascular - negative cardio ROS    (+) dysrhythmias Tachycardia      Neuro/Psych- negative ROS  (+) psychiatric history Anxiety and Depression  GI/Hepatic/Renal/Endo - negative ROS   (+) morbid obesity    Musculoskeletal (-) negative ROS    Abdominal    Substance History - negative use     OB/GYN negative ob/gyn ROS   (+) Pregnant, Preeclampsia        Other                    Anesthesia Plan    ASA 3     epidural       Anesthetic plan, risks, benefits, and alternatives have been provided, discussed and informed consent has been obtained with: patient.    Use of blood products discussed with patient .      CODE STATUS:    Level Of Support Discussed With: Patient  Code Status (Patient has no pulse and is not breathing): CPR (Attempt to Resuscitate)  Medical Interventions (Patient has pulse or is breathing): Full Support

## 2024-01-27 NOTE — ANESTHESIA PROCEDURE NOTES
Labor Epidural      Patient reassessed immediately prior to procedure    Patient location during procedure: OB  Performed By  Anesthesiologist: Xu Borja DO  Preanesthetic Checklist  Completed: patient identified, IV checked, site marked, risks and benefits discussed, surgical consent, monitors and equipment checked, pre-op evaluation and timeout performed  Prep:  Pt Position:sitting  Sterile Tech:gloves, mask, sterile barrier and cap  Prep:chlorhexidine gluconate and isopropyl alcohol  Monitoring:blood pressure monitoring and continuous pulse oximetry  Epidural Block Procedure:  Approach:midline  Guidance:landmark technique and palpation technique  Location:L2-L3  Needle Type:Tuohy  Needle Gauge:17 G  Loss of Resistance Medium: air  Loss of Resistance: 5cm  Cath Depth at skin:11 cm  Paresthesia: none  Aspiration:negative  Test Dose:negative  Number of Attempts: 1  Post Assessment:  Dressing:secured with tape and occlusive dressing applied (Tegaderm Placed)  Pt Tolerance:patient tolerated the procedure well with no apparent complications  Complications:no

## 2024-01-27 NOTE — LACTATION NOTE
01/27/24 1645   Maternal Information   Date of Referral 01/27/24   Person Making Referral lactation consultant   Maternal Reason for Referral no prior breastfeeding experience  (courtesy visit for new delivery.)   Infant Reason for Referral 35-37 weeks gestation;NICU admission   Maternal Assessment   Breast Size Issue none   Breast Shape Bilateral:;round   Breast Density Bilateral:;soft   Left Nipple Symptoms intact;nontender   Right Nipple Symptoms intact;nontender   Maternal Infant Feeding   Maternal Emotional State independent;receptive   Support Person Involvement actively supporting mother  (FOB @ bedside & very attentive to setting up pump, cleaning & how long milk can sit out)   Milk Expression/Equipment   Breast Pump Type double electric, hospital grade;double electric, personal  (pt encouraged to pump q3hrs using hosp pump and she has a spectra pump for home. QR instructional code given to patient.)

## 2024-01-27 NOTE — PROGRESS NOTES
Laborist    Cervical Maurer out  Fetal heart rate category 1  Blood pressure within normal limit  Sve  4 cm 70% -2 vertex  AROM moderate mount of clear fluid.

## 2024-01-28 LAB
ABO GROUP BLD: NORMAL
BASOPHILS # BLD AUTO: 0.02 10*3/MM3 (ref 0–0.2)
BASOPHILS NFR BLD AUTO: 0.1 % (ref 0–1.5)
DEPRECATED RDW RBC AUTO: 45.9 FL (ref 37–54)
EOSINOPHIL # BLD AUTO: 0.13 10*3/MM3 (ref 0–0.4)
EOSINOPHIL NFR BLD AUTO: 0.9 % (ref 0.3–6.2)
ERYTHROCYTE [DISTWIDTH] IN BLOOD BY AUTOMATED COUNT: 12.8 % (ref 12.3–15.4)
FETAL BLEED: NEGATIVE
HCT VFR BLD AUTO: 28 % (ref 34–46.6)
HGB BLD-MCNC: 9.3 G/DL (ref 12–15.9)
IMM GRANULOCYTES # BLD AUTO: 0.15 10*3/MM3 (ref 0–0.05)
IMM GRANULOCYTES NFR BLD AUTO: 1.1 % (ref 0–0.5)
LYMPHOCYTES # BLD AUTO: 2.43 10*3/MM3 (ref 0.7–3.1)
LYMPHOCYTES NFR BLD AUTO: 17.3 % (ref 19.6–45.3)
MCH RBC QN AUTO: 32.7 PG (ref 26.6–33)
MCHC RBC AUTO-ENTMCNC: 33.2 G/DL (ref 31.5–35.7)
MCV RBC AUTO: 98.6 FL (ref 79–97)
MONOCYTES # BLD AUTO: 0.94 10*3/MM3 (ref 0.1–0.9)
MONOCYTES NFR BLD AUTO: 6.7 % (ref 5–12)
NEUTROPHILS NFR BLD AUTO: 10.37 10*3/MM3 (ref 1.7–7)
NEUTROPHILS NFR BLD AUTO: 73.9 % (ref 42.7–76)
NRBC BLD AUTO-RTO: 0 /100 WBC (ref 0–0.2)
NUMBER OF DOSES: NORMAL
PLATELET # BLD AUTO: 227 10*3/MM3 (ref 140–450)
PMV BLD AUTO: 9.8 FL (ref 6–12)
RBC # BLD AUTO: 2.84 10*6/MM3 (ref 3.77–5.28)
RH BLD: NEGATIVE
WBC NRBC COR # BLD AUTO: 14.04 10*3/MM3 (ref 3.4–10.8)

## 2024-01-28 PROCEDURE — 85025 COMPLETE CBC W/AUTO DIFF WBC: CPT | Performed by: OBSTETRICS & GYNECOLOGY

## 2024-01-28 PROCEDURE — 25810000003 LACTATED RINGERS PER 1000 ML: Performed by: OBSTETRICS & GYNECOLOGY

## 2024-01-28 PROCEDURE — 86900 BLOOD TYPING SEROLOGIC ABO: CPT | Performed by: OBSTETRICS & GYNECOLOGY

## 2024-01-28 PROCEDURE — 25010000002 MAGNESIUM SULFATE 20 GM/500ML SOLUTION: Performed by: OBSTETRICS & GYNECOLOGY

## 2024-01-28 PROCEDURE — 25010000002 RHO D IMMUNE GLOBULIN 1500 UNIT/2ML SOLUTION PREFILLED SYRINGE: Performed by: OBSTETRICS & GYNECOLOGY

## 2024-01-28 PROCEDURE — 99231 SBSQ HOSP IP/OBS SF/LOW 25: CPT | Performed by: OBSTETRICS & GYNECOLOGY

## 2024-01-28 PROCEDURE — 85461 HEMOGLOBIN FETAL: CPT | Performed by: OBSTETRICS & GYNECOLOGY

## 2024-01-28 PROCEDURE — 86901 BLOOD TYPING SEROLOGIC RH(D): CPT | Performed by: OBSTETRICS & GYNECOLOGY

## 2024-01-28 RX ADMIN — FERROUS SULFATE TAB 325 MG (65 MG ELEMENTAL FE) 162.5 MG: 325 (65 FE) TAB at 07:31

## 2024-01-28 RX ADMIN — DOCUSATE SODIUM 100 MG: 100 CAPSULE, LIQUID FILLED ORAL at 08:32

## 2024-01-28 RX ADMIN — ACETAMINOPHEN 650 MG: 325 TABLET ORAL at 20:40

## 2024-01-28 RX ADMIN — MONTELUKAST 10 MG: 10 TABLET, FILM COATED ORAL at 20:41

## 2024-01-28 RX ADMIN — IBUPROFEN 600 MG: 600 TABLET ORAL at 07:37

## 2024-01-28 RX ADMIN — OXYCODONE HYDROCHLORIDE AND ACETAMINOPHEN 500 MG: 500 TABLET ORAL at 07:31

## 2024-01-28 RX ADMIN — FAMOTIDINE 20 MG: 20 TABLET, FILM COATED ORAL at 17:56

## 2024-01-28 RX ADMIN — IBUPROFEN 600 MG: 600 TABLET ORAL at 15:37

## 2024-01-28 RX ADMIN — DOCUSATE SODIUM 100 MG: 100 CAPSULE, LIQUID FILLED ORAL at 20:41

## 2024-01-28 RX ADMIN — CETIRIZINE HYDROCHLORIDE 10 MG: 10 TABLET, FILM COATED ORAL at 08:32

## 2024-01-28 RX ADMIN — SODIUM CHLORIDE, POTASSIUM CHLORIDE, SODIUM LACTATE AND CALCIUM CHLORIDE 100 ML/HR: 600; 310; 30; 20 INJECTION, SOLUTION INTRAVENOUS at 02:03

## 2024-01-28 RX ADMIN — FAMOTIDINE 20 MG: 20 TABLET, FILM COATED ORAL at 07:31

## 2024-01-28 RX ADMIN — HUMAN RHO(D) IMMUNE GLOBULIN 1500 UNITS: 1500 SOLUTION INTRAMUSCULAR; INTRAVENOUS at 16:11

## 2024-01-28 NOTE — PROGRESS NOTES
"    Maternal/Fetal Medicine Postpartum Visit     Name: Toya Root    : 2000     MRN: [id number]     Referring Provider: JUANITO Jones    Chief Complaint  Hypertension    Subjective     Note patient not in room during attempted visit x 2     History of Present Illness:  Toya Root is a 24 y.o.  PPD1 s/p  in setting of severe preeclampsia.   Mag d/c'd this AM   No BP meds currently   Per nursing, patient doing well     ROS:   N/A     Objective     Vital Signs  /82   Pulse 86   Temp 98.1 °F (36.7 °C) (Oral)   Resp 16   Ht 157.5 cm (62\")   Wt 105 kg (231 lb)   LMP 2023   Estimated body mass index is 42.25 kg/m² as calculated from the following:    Height as of this encounter: 157.5 cm (62\").    Weight as of this encounter: 105 kg (231 lb).    Physical Exam:  N/A       CBC:   Lab Results   Component Value Date    WBC 14.04 (H) 2024    HGB 9.3 (L) 2024    HCT 28.0 (L) 2024     2024       Assessment and Plan       PPD#1 s/p  in setting of severe preeclampsia   By chart review and nursing report, patient recovering well   S/p Mag Sulfate   Cont routine postpartum care       Karla Pinedo MD FACOG  Maternal Fetal Medicine, [facility]     2024  "

## 2024-01-29 VITALS
HEIGHT: 62 IN | OXYGEN SATURATION: 98 % | BODY MASS INDEX: 42.51 KG/M2 | SYSTOLIC BLOOD PRESSURE: 136 MMHG | DIASTOLIC BLOOD PRESSURE: 76 MMHG | WEIGHT: 231 LBS | HEART RATE: 81 BPM | RESPIRATION RATE: 16 BRPM | TEMPERATURE: 98.3 F

## 2024-01-29 LAB — REF LAB TEST METHOD: NORMAL

## 2024-01-29 PROCEDURE — 99239 HOSP IP/OBS DSCHRG MGMT >30: CPT | Performed by: OBSTETRICS & GYNECOLOGY

## 2024-01-29 RX ORDER — IBUPROFEN 600 MG/1
600 TABLET ORAL EVERY 6 HOURS PRN
Qty: 90 TABLET | Refills: 0 | Status: SHIPPED | OUTPATIENT
Start: 2024-01-29

## 2024-01-29 RX ADMIN — FAMOTIDINE 20 MG: 20 TABLET, FILM COATED ORAL at 07:56

## 2024-01-29 RX ADMIN — DOCUSATE SODIUM 100 MG: 100 CAPSULE, LIQUID FILLED ORAL at 07:55

## 2024-01-29 RX ADMIN — OXYCODONE HYDROCHLORIDE AND ACETAMINOPHEN 500 MG: 500 TABLET ORAL at 08:06

## 2024-01-29 RX ADMIN — IBUPROFEN 600 MG: 600 TABLET ORAL at 08:00

## 2024-01-29 RX ADMIN — CETIRIZINE HYDROCHLORIDE 10 MG: 10 TABLET, FILM COATED ORAL at 07:55

## 2024-01-29 NOTE — PLAN OF CARE
Problem: Adult Inpatient Plan of Care  Goal: Plan of Care Review  Outcome: Ongoing, Progressing  Goal: Patient-Specific Goal (Individualized)  Outcome: Ongoing, Progressing  Goal: Absence of Hospital-Acquired Illness or Injury  Outcome: Ongoing, Progressing  Goal: Optimal Comfort and Wellbeing  Outcome: Ongoing, Progressing  Goal: Readiness for Transition of Care  Outcome: Ongoing, Progressing   Goal Outcome Evaluation:

## 2024-01-29 NOTE — LACTATION NOTE
01/29/24 1015   Maternal Information   Date of Referral 01/29/24   Person Making Referral lactation consultant  (courtesy revisit)   Maternal Reason for Referral no prior breastfeeding experience   Infant Reason for Referral 35-37 weeks gestation;NICU admission  (Patient stated that she pumped 12 mls of milk at her last pumping session.)   Maternal Assessment   Breast Size Issue none   Breast Shape Bilateral:;round   Breast Density Bilateral:;soft   Left Nipple Symptoms intact;nontender   Right Nipple Symptoms intact;nontender   Maternal Infant Feeding   Maternal Emotional State receptive   Support Person Involvement actively supporting mother   Milk Expression/Equipment   Breast Pump Type double electric, hospital grade;double electric, personal  (encouraged patient to discuss pump for preeemie program with the NICU staff.)   Breast Pumping   Breast Pumping Interventions frequent pumping encouraged  (encouraged pumping every 3 hours around the clock)   Breast Pumping double electric breast pump utilized   Lactation Referrals   Lactation Referrals outpatient lactation program  (encouraged an outpatient lactation clinic appointment after infant's discharge from NICU.)     All questions/concerns answered at this time.  Encouraged PRN lactation as needed.

## 2024-01-29 NOTE — DISCHARGE SUMMARY
Toya Root  [id number]  2000      Referring physician: Nicholas Crews, JUANITO     Chief complaint: severe preeclampsia    Date of admission: 1/25/2024     Date of discharge: 1/29/2024    Procedures: vaginal delivery    Consultations: Maternal-Fetal Medicine    S/ No complaints.  ROS neg for HA, CP, SOB  O/ 117/58, 16, 76, 98.3   Lungs: CTA   Heart: RRR, no m,g,r   Abd: +BS, soft, non-tend   Fund: U, firm, non-tend   Ext: no calf tend    A/1) PPD #2 stable     2) severe preeclampsia- BP's normal on no meds    Hospital Course: Pt was admitted with diag severe preeclampsia.  Pt had transcervical bruno placed for cervical ripening.  Pt had a long labor, but underwent an uncomplicated vaginal delivery of a 6lb 4.2oz male with apgars 6 and 7.  Pt was continued on magnesium for 24 hrs post delivery.  Pt has done well and is requiring only Motrin and Tyl for pain.  Her BP's and HR are normal (metoprolol was d/c'ed after admission due to low BP).  Pt is breast and bottle feeding.  She is tolerating a regular diet and ambulating well.  Pt will be d/c'ed home today.  D/c instructions have been reviewed in detail and all questions have been answered.  Pt has been asked to contact Nicholas Crews today or tomorrow for a f/u BP this week and to schedule her postpartum f/u.    P/1) d/c home today     2) d/c meds: Motrin (#90)    I spent over 30 minutes on discharge activity which included: face-to-face encounter counseling with the patient, reviewing the data in the system, coordination of the care with the nursing staff as well as consultants, documentation, and entering orders.      Blanco Mathews MD  01/29/24  07:03 EST

## 2024-01-29 NOTE — PAYOR COMM NOTE
"Milagros Thompson (24 y.o. Female)     [place] Auth#[id number]     Clinical update for 24   Discharged 24    From:Samia Da Silva LPN, Utilization Review  Phone #[phone]  Fax #[phone]        Date of Birth   2000    Social Security Number       Address   [address] [place] [id number]    Home Phone   [phone]    MRN   [id number]       Orthodoxy   Rastafarian    Marital Status                               Admission Date   24    Admission Type   Elective    Admitting Provider   Alessandro Romero MD    Attending Provider       Department, Room/Bed   [facility] MOTHER BABY 4B, N435/1       Discharge Date   2024    Discharge Disposition   Home or Self Care    Discharge Destination                                 Attending Provider: (none)   Allergies: Trimethoprim    Isolation: None   Infection: None   Code Status: Prior    Ht: 157.5 cm (62\")   Wt: 105 kg (231 lb)    Admission Cmt: None   Principal Problem: Preeclampsia, severe, third trimester [O14.13]                   Active Insurance as of 2024       Primary Coverage       Payor Plan Insurance Group Employer/Plan Group    [employer]UE [employer] PPO [id number]       Payor Plan Address Payor Plan Phone Number Payor Plan Fax Number Effective Dates    [address] [phone]  2023 - None Entered    [facility] [id number]         Subscriber Name Subscriber Birth Date Member ID       MILAGROS THOMPSON 2000 [id number]                     Emergency Contacts        (Rel.) Home Phone Work Phone Mobile Phone    Prince Thompson (Spouse) [phone] -- --                 Operative/Procedure Notes (last 7 days)        Alessandro Romero MD at 24 0806          24 y.o.  OB History          1    Para        Term                AB        Living             SAB        IAB        Ectopic        Molar        Multiple     "    Live Births                 Presents 35 0/7 as an induction of labour due to severe pre-eclampsia   Her primary OB requests a Maurer Bulb placement to initiate the induction of labour.    Fetal Heart Rate Assessment   Method: Fetal HR Assessment Method: external   Beats/min: Fetal HR (beats/min): 125   Baseline: Fetal HR Baseline: normal range   Varibility: Fetal HR Variability: moderate (amplitude range 6 to 25 bpm)   Accels: Fetal HR Accelerations: greater than/equal to 15 bpm, lasting at least 15 seconds   Decels: Fetal HR Decelerations: absent   Tracing Category:       TOCO:  Irregular   SVE:  60/-2    A Maurer Bulb was placed without difficulties with 60 cc of sterile saline.  The patient tolerated the procedure well.    Electronically signed by Alessandro Romero MD at 24 0813       Blanco Mathews MD at 24 0741           [facility]   Vaginal Delivery Note    Patient Name: Toya Root  : 2000  MRN: [id number]    Date of Delivery: 2024     Diagnosis     Pre & Post-Delivery:  Intrauterine pregnancy at 35w1d  Labor status: Induced Onset of Labor     Preeclampsia, severe, third trimester             Problem List    Transfer to Postpartum     Review the Delivery Report for details.     Delivery     Delivery: Vaginal, Spontaneous     YOB: 2022    Time of Birth:  Gestational Age 7:09 AM   35w1d     Anesthesia: Epidural     Delivering clinician: Blanco Mathews    Forceps?   No   Vacuum? No    Shoulder dystocia present: No        Delivery narrative:  Pt spont del a male infant over an intact perineum.  Shoulder cord reduced on perineum.  Cord milked X 3,  cord clamped and cut.  Segment for ABG's obtained.  Cord blood obtained and placenta del intact.  Bilat sulcus tears repaired and second degree perineal repaired with restoration of normal anatomy.  Uterus explored with no palpable fragments.      Infant     Findings: male  infant     Infant observations:  Weight: No birth weight on file.   Length:   in  Observations/Comments:        Apgars: 6  @ 1 minute /    7  @ 5 minutes   Infant Name: Carlos     Placenta & Cord         Placenta delivered  Spontaneous  at   2024  7:12 AM     Cord:   present.   Nuchal Cord?  no   Cord blood obtained:     Cord gases obtained:      Cord gas results: Venous:    pH, Cord Venous   Date Value Ref Range Status   2024 7.408 (H) 7.310 - 7.370 pH Units Final     Base Excess, Cord Venous   Date Value Ref Range Status   2024 -1.2 (L) 0.0 - 2.0 mmol/L Final       Arterial:    pH, Cord Arterial   Date Value Ref Range Status   2024 7.22 7.22 - 7.30 pH Units Final     Base Exc, Cord Arterial   Date Value Ref Range Status   2024 -6.3 (L) 0.0 - 2.0 mmol/L Final        Repair     Episiotomy: None     No    Lacerations: Yes  Laceration Information  Laceration Repaired?   Perineal: None      Periurethral:       Labial:       Sulcus: bilateral  Yes    Vaginal:       Cervical:         Suture used for repair: 2-0 vicryl rapide     Estimated Blood Loss: Est. Blood Loss (mL): 100 mL (Filed from Delivery Summary) (24 0709)     Quantitative Blood Loss:          Complications     hypertension    Disposition     Mother to APU  in stable condition currently.  Baby to NICU  in stable condition currently.    Blanco Mathews MD  24  07:41 EST          Electronically signed by Blanco Mathews MD at 24 0745          Physician Progress Notes (last 4 days)        Karla Pinedo MD at 24 0903              Maternal/Fetal Medicine Postpartum Visit     Name: Toya Root    : 2000     MRN: [id number]     Referring Provider: JUANITO Jones    Chief Complaint  Hypertension    Subjective     Note patient not in room during attempted visit x 2     History of Present Illness:  Toya Root is a 24 y.o.  PPD1 s/p  in setting of severe preeclampsia.   Mag d/c'd this AM  "  No BP meds currently   Per nursing, patient doing well     ROS:   N/A     Objective     Vital Signs  /82   Pulse 86   Temp 98.1 °F (36.7 °C) (Oral)   Resp 16   Ht 157.5 cm (62\")   Wt 105 kg (231 lb)   LMP 2023   Estimated body mass index is 42.25 kg/m² as calculated from the following:    Height as of this encounter: 157.5 cm (62\").    Weight as of this encounter: 105 kg (231 lb).    Physical Exam:  N/A       CBC:   Lab Results   Component Value Date    WBC 14.04 (H) 2024    HGB 9.3 (L) 2024    HCT 28.0 (L) 2024     2024       Assessment and Plan       PPD#1 s/p  in setting of severe preeclampsia   By chart review and nursing report, patient recovering well   S/p Mag Sulfate   Cont routine postpartum care       Karla Pinedo MD FACOG  Maternal Fetal Medicine, [facility]     2024    Electronically signed by Karla Pinedo MD at 24 1422       Blanco Mathews MD at 24 0608            Toya Root  [id number]  2000      VE 7-8/90%/-1.  Pt now with epidural  FHT's reassuring, contractions adequate.  BP's stable  P/1)continue current management    Blanco Mathews MD  2024  06:08 EST       Electronically signed by Blanoc Mathews MD at 24 0609       Blanco Mathews MD at 24 0158            Toya Root  [id number]  2000      VE 5/80%/-1, IUPC placed  FHT's reassuring, contractions still prob not adequate  BP's stable  P/1)continue current management\     2)epidural when anesthesia is available      Blanco Mathews MD  2024  01:58 EST       Electronically signed by Blanco Mathews MD at 24 0159       Carlos A Pritchett DO at 24 1933          Laborist    Cervical Maurer out  Fetal heart rate category 1  Blood pressure within normal limit  Sve  4 cm 70% -2 vertex  AROM moderate mount of clear fluid.      Electronically signed by " Carlos A Pritchett DO at 01/26/24 1936       Carlos A Pritchett DO at 01/26/24 1055          Laborist    Patient seen and examined chart reviewed  Cervical Bruno in place patient without complaints.  Fetal heart rate category 1  Blood pressure stable  Cephalic confirmed with portable ultrasound  PLAN  Continue induction of labor.    Electronically signed by Carlos A Pritchett DO at 01/26/24 1057          Discharge Summary        Blanco Mathews MD at 01/29/24 0703          Toya Root  [id number]  2000      Referring physician: JUANITO Jones     Chief complaint: severe preeclampsia    Date of admission: 1/25/2024     Date of discharge: 1/29/2024    Procedures: vaginal delivery    Consultations: Maternal-Fetal Medicine    S/ No complaints.  ROS neg for HA, CP, SOB  O/ 117/58, 16, 76, 98.3   Lungs: CTA   Heart: RRR, no m,g,r   Abd: +BS, soft, non-tend   Fund: U, firm, non-tend   Ext: no calf tend    A/1) PPD #2 stable     2) severe preeclampsia- BP's normal on no meds    Hospital Course: Pt was admitted with diag severe preeclampsia.  Pt had transcervical bruno placed for cervical ripening.  Pt had a long labor, but underwent an uncomplicated vaginal delivery of a 6lb 4.2oz male with apgars 6 and 7.  Pt was continued on magnesium for 24 hrs post delivery.  Pt has done well and is requiring only Motrin and Tyl for pain.  Her BP's and HR are normal (metoprolol was d/c'ed after admission due to low BP).  Pt is breast and bottle feeding.  She is tolerating a regular diet and ambulating well.  Pt will be d/c'ed home today.  D/c instructions have been reviewed in detail and all questions have been answered.  Pt has been asked to contact [facility] Crews today or tomorrow for a f/u BP this week and to schedule her postpartum f/u.    P/1) d/c home today     2) d/c meds: Motrin (#90)    I spent over 30 minutes on discharge activity which included: face-to-face encounter counseling with the patient,  reviewing the data in the system, coordination of the care with the nursing staff as well as consultants, documentation, and entering orders.      Blanco Mathews MD  01/29/24  07:03 EST     Electronically signed by Blanco Mahtews MD at 01/29/24 0701

## 2024-01-29 NOTE — PLAN OF CARE
Goal Outcome Evaluation:  VSS, fundus/lochia/perineum WDL, pain well controlled, voiding without difficulty, utilizing bakari-products, breastfeeding and pumping.

## 2024-02-05 ENCOUNTER — MATERNAL SCREENING (OUTPATIENT)
Dept: CALL CENTER | Facility: HOSPITAL | Age: 24
End: 2024-02-05
Payer: COMMERCIAL

## 2024-02-05 NOTE — OUTREACH NOTE
Maternal Screening Survey      Flowsheet Row Responses   Facility patient discharged from? [facility]   Attempt successful? Yes   Call start time 1356   Call end time 1403   Person spoke with today (if not patient) and relationship patient   EPD Scale: Able to Laugh 0-->as much as she always could   EPD Scale: Looked Forward 0-->as much as she ever did   EPD Scale: Blamed Self 1-->not very often   EPD Scale: Been Anxious 2-->yes, sometimes   EPD Scale: Felt Panicky 1-->no, not much   EPD Scale: Things Getting on Top 0-->no, has been coping as well as ever   EPD Scale: Difficulty Sleeping 0-->no, not at all   EPD Scale: Sad or Miserable 1-->not very often  [Patient sad because baby in [place]]   EPD Scale: Crying 2-->yes, quite often   EPD Scale: Thought of Harming Self 0-->never   [place]  Depression Score 7   Did any of your parents have problems with alcohol or drug use? No   Do any of your peers have problems with alcohol or drug use? No   Does your partner have problems with alcohol or drug use? No   Before you were pregnant did you have problems with alcohol or drug use? (past) No   In the past month, did you drink beer, wine, liquor or use any other drugs? (pregnancy) No   Maternal Screening call completed Yes   Wrap up additional comments Baby is in [place]. Patient reports she worries about that.   Call end time 1403              Luca KAUFMAN - Registered Nurse